# Patient Record
Sex: FEMALE | Race: WHITE | NOT HISPANIC OR LATINO | Employment: OTHER | URBAN - METROPOLITAN AREA
[De-identification: names, ages, dates, MRNs, and addresses within clinical notes are randomized per-mention and may not be internally consistent; named-entity substitution may affect disease eponyms.]

---

## 2017-03-03 ENCOUNTER — TRANSCRIBE ORDERS (OUTPATIENT)
Dept: ADMINISTRATIVE | Facility: HOSPITAL | Age: 67
End: 2017-03-03

## 2017-03-03 DIAGNOSIS — Z85.850 HX OF THYROID CANCER: Primary | ICD-10-CM

## 2017-03-09 ENCOUNTER — HOSPITAL ENCOUNTER (OUTPATIENT)
Dept: RADIOLOGY | Age: 67
Discharge: HOME | End: 2017-03-09
Payer: MEDICARE

## 2017-03-09 DIAGNOSIS — Z85.850 HX OF THYROID CANCER: ICD-10-CM

## 2017-03-09 PROCEDURE — 76536 US EXAM OF HEAD AND NECK: CPT

## 2017-07-28 DIAGNOSIS — Z12.31 ENCOUNTER FOR SCREENING MAMMOGRAM FOR MALIGNANT NEOPLASM OF BREAST: ICD-10-CM

## 2017-08-03 ENCOUNTER — GENERIC CONVERSION - ENCOUNTER (OUTPATIENT)
Dept: OTHER | Facility: OTHER | Age: 67
End: 2017-08-03

## 2018-01-03 ENCOUNTER — TRANSCRIBE ORDERS (OUTPATIENT)
Dept: ADMINISTRATIVE | Facility: HOSPITAL | Age: 68
End: 2018-01-03

## 2018-01-03 ENCOUNTER — ALLSCRIPTS OFFICE VISIT (OUTPATIENT)
Dept: OTHER | Facility: OTHER | Age: 68
End: 2018-01-03

## 2018-01-03 DIAGNOSIS — N63.0 MASS OF BREAST: ICD-10-CM

## 2018-01-03 DIAGNOSIS — N63.0 LUMP OR MASS IN BREAST: Primary | ICD-10-CM

## 2018-01-05 ENCOUNTER — HOSPITAL ENCOUNTER (OUTPATIENT)
Dept: ULTRASOUND IMAGING | Facility: CLINIC | Age: 68
Discharge: HOME | End: 2018-01-05
Payer: MEDICARE

## 2018-01-05 DIAGNOSIS — N63.0 MASS OF BREAST: ICD-10-CM

## 2018-01-05 PROCEDURE — 76642 ULTRASOUND BREAST LIMITED: CPT

## 2018-01-05 NOTE — PROGRESS NOTES
Assessment   1  Breast mass in female (611 72) (N63 0)    Plan   Breast mass in female    · *US BREAST RIGHT LIMITED (DIAGNOSTIC); Status:Hold For - Scheduling; Requested    QLI:[id number]; Perform:[facility]; SHM:[id number];ACNRHKG; For:Breast mass in female; Ordered By:Keena Benavides; Discussion/Summary   Discussion Summary:    Pt does not recall any trauma to breast area  Will plan diagnostic US right breast tomorrow and f/u as needed based on results  Chief Complaint   Chief Complaint Free Text Note Form: Pt for breast complaints  History of Present Illness   HPI: Pt is here for a breast complaint  On  patient's right breast was painful and hard  Yesterday she saw a bruise on her right breast and called the office  No history of trauma or bumps to the area  No nipple discharge  Active Problems   1  Abnormal finding on mammography (793 80) (R92 8)   2  Encounter for gynecological examination (V72 31) (Z01 419)   3  Encounter for screening mammogram for breast cancer (V76 12) (Z12 31)   4  History of hyperlipidemia (V12 29) (Z86 39)   5  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)   6  Postsurgical hypothyroidism (244 0) (E89 0)   7  Yeast vaginitis (112 1) (B37 3)    Past Medical History   1  History of Breast cancer screening (V76 10) (Z12 39)   2   2 para 2 (V49 89) (Z78 9)   3  History of hypertension (V12 59) (Z86 79)   4  History of Menarche (V21 8)   5  History of 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)    Surgical History   1  History of Cholecystectomy   2  History of Inguinal Hernia Repair   3  History of Surgery Excision Of Parotid Tumor/Gland   4  History of Thoracotomy   5  History of Thyroid Surgery Total Thyroidectomy    Family History   Mother    1  Family history of Breast Cancer (V16 3)   2  Family history of cardiac disorder (V17 49) (Z82 49)  Father    3  Family history of Diabetes Mellitus (V18 0)  Maternal Aunt    4   Family history of colon cancer (V16 0) (Z80 0)  Family History    5  Family history of Diabetes Mellitus (V18 0)    Social History    · Being A Social Drinker   · Cultural background   · Denied: History of Drug Use   · Former smoker (V15 82) (H27 380)   ·    · Primary spoken language English   · Racial background    Current Meds    1  Atenolol 50 MG Oral Tablet; Take 1 tablet once daily Recorded   2  Fluconazole 150 MG Oral Tablet; Take pill 1 stat, skip a day, and take pill 2 on day 3; Therapy: [id number] to (Last Rx:03Aug2016)  Requested for: 03Aug2016 Ordered   3  Simvastatin 20 MG Oral Tablet; Therapy: (Recorded:12Jun2013) to Recorded   4  Synthroid 137 MCG Oral Tablet; Therapy: (Recorded:12Jun2013) to Recorded   5  Valsartan-Hydrochlorothiazide 320-25 MG Oral Tablet Recorded   6  Zolpidem Tartrate 10 MG Oral Tablet; Therapy: (Recorded:12Jun2013) to Recorded    Allergies   1  Codeine Sulfate TABS    Vitals   Vital Signs    Recorded: 69BSW9909 69:04GU   Systolic 398   Diastolic 68   Height 5 ft 6 in   Weight 193 lb    BMI Calculated 31 15   BSA Calculated 1 97     Physical Exam        Constitutional      General appearance: No acute distress, well appearing and well nourished  Chest      Breasts: Abnormal  -- (right breast w erythematous area approx the size of a baseball extending from areola to outer breast quandrant  approx 6-8 oclock  under erythematous area w thickened mass-felt flucutant, approx 1inch by 2 inch oblong structure, non tender, no nipple d/c  No dimpling noted, no puckering) Right breast: area of erythema-- and-- swelling, but-- no [place],-- no dimpling,-- no dilated superficial veins-- and-- no implant  Left breast: normal appearance  Examination of the nipples revealed no discharge  Examination of the right nipple revealed no inversion,-- no retraction-- and-- no flattening  Examination of the left nipple revealed no inversion,-- no retraction-- and-- no flattening  Psychiatric      Orientation to person, place, and time: Normal        Mood and affect: Normal        Attending Note   Collaborating Physician Note: Collaborating Physician: I discussed the case with the Advanced Practitioner and reviewed the note-- and-- I agree with the Advanced Practitioner note        Future Appointments      Date/Time Provider Specialty Site   02/13/2018 09:00 AM Kezia Benavides Cap, Nellie0 Silvia Prater Obstetrics[facility]Gynecology [address] OBGYN     Signatures    Electronically signed by : Tu Sagastume; Isma  3 2018 12:37PM EST                       (Author)     Electronically signed by : Justice Cavazos MD; Jan 4 2018 11:45AM EST                       (Author)

## 2018-01-23 VITALS
BODY MASS INDEX: 31.02 KG/M2 | HEIGHT: 66 IN | DIASTOLIC BLOOD PRESSURE: 68 MMHG | WEIGHT: 193 LBS | SYSTOLIC BLOOD PRESSURE: 130 MMHG

## 2018-02-13 ENCOUNTER — OFFICE VISIT (OUTPATIENT)
Dept: OBGYN CLINIC | Facility: CLINIC | Age: 68
End: 2018-02-13
Payer: MEDICARE

## 2018-02-13 VITALS
WEIGHT: 195 LBS | SYSTOLIC BLOOD PRESSURE: 126 MMHG | DIASTOLIC BLOOD PRESSURE: 78 MMHG | BODY MASS INDEX: 32.49 KG/M2 | HEIGHT: 65 IN

## 2018-02-13 DIAGNOSIS — Z01.419 ENCOUNTER FOR ANNUAL ROUTINE GYNECOLOGICAL EXAMINATION: Primary | ICD-10-CM

## 2018-02-13 PROBLEM — N63.0 BREAST MASS IN FEMALE: Status: ACTIVE | Noted: 2018-01-03

## 2018-02-13 PROBLEM — E89.0 HYPOTHYROIDISM, POSTABLATIVE: Status: ACTIVE | Noted: 2017-04-18

## 2018-02-13 PROCEDURE — G0101 CA SCREEN;PELVIC/BREAST EXAM: HCPCS | Performed by: PHYSICIAN ASSISTANT

## 2018-02-13 RX ORDER — LEVOTHYROXINE SODIUM 137 UG/1
TABLET ORAL
COMMUNITY
End: 2018-02-13 | Stop reason: SDUPTHER

## 2018-02-13 RX ORDER — VALSARTAN AND HYDROCHLOROTHIAZIDE 320; 25 MG/1; MG/1
TABLET, FILM COATED ORAL
COMMUNITY
End: 2018-02-13 | Stop reason: SDUPTHER

## 2018-02-13 RX ORDER — ALLOPURINOL 300 MG/1
300 TABLET ORAL DAILY
COMMUNITY
Start: 2017-12-31

## 2018-02-13 RX ORDER — ERGOCALCIFEROL 1.25 MG/1
12500 CAPSULE ORAL WEEKLY
COMMUNITY
Start: 2018-01-07

## 2018-02-13 RX ORDER — SIMVASTATIN 20 MG
20 TABLET ORAL
COMMUNITY
Start: 2014-04-29 | End: 2018-03-03

## 2018-02-13 NOTE — PROGRESS NOTES
Assessment/Plan:         Diagnoses and all orders for this visit:    Encounter for annual routine gynecological examination  -     Mammo screening bilateral w 3d & cad; Future  -     GP Liquid-Based Pap + HPV Plus    Other orders  -     allopurinol (ZYLOPRIM) 300 mg tablet;   -     ergocalciferol (VITAMIN D2) 50,000 units;   -     simvastatin (ZOCOR) 20 mg tablet; Take 20 mg by mouth  -     Discontinue: levothyroxine (SYNTHROID) 137 mcg tablet; Take by mouth  -     Discontinue: valsartan-hydrochlorothiazide (DIOVAN-HCT) 320-25 MG per tablet; Take by mouth          Subjective:      Patient ID: Johnathan Young is a 78 y o  female  Pt for annual GYN exam  No  complaints of hot flashes or night sweats  No recurrent breast issues  Not currently sexually active  Denies vaginal dryness or vaginal complaints  Doing well  The following portions of the patient's history were reviewed and updated as appropriate: past family history, past medical history, past social history, past surgical history and problem list     Review of Systems   Constitutional: Negative  Respiratory: Negative  Gastrointestinal: Negative  Endocrine: Negative  Genitourinary: Negative  Objective:    Vitals:    02/13/18 0915   BP: 126/78        Physical Exam   Constitutional: She is oriented to person, place, and time  She appears well-developed and well-nourished  HENT:   Head: Normocephalic  Neck: Normal range of motion  Neck supple  No tracheal deviation present  No thyromegaly present  Cardiovascular: Normal rate, regular rhythm and normal heart sounds  Pulmonary/Chest: Effort normal  No stridor  No respiratory distress  She has no wheezes  She has no rales  She exhibits no tenderness  Abdominal: Soft  Bowel sounds are normal  She exhibits no distension and no mass  There is no rebound and no guarding  Genitourinary: Uterus normal  Rectal exam shows guaiac negative stool   No vaginal discharge (cx bled a bit w pap) found  Musculoskeletal: She exhibits no edema  Neurological: She is alert and oriented to person, place, and time  Skin: Skin is warm  Psychiatric: She has a normal mood and affect  Her behavior is normal  Judgment and thought content normal        Past Medical History:   Diagnosis Date    [facility]     Cancer [facility] )     Disease of thyroid gland     Gout     History of early menarche     age 16    Hypertension     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[facility] )      Past Surgical History:   Procedure Laterality Date    CHOLECYSTECTOMY      HERNIA REPAIR      INGUINAL HERNIA REPAIR      SALIVARY GLAND SURGERY      excison of tumor    THYROIDECTOMY       Social History     Social History    Marital status: /Civil Union     Spouse name: N/A    Number of children: N/A    Years of education: N/A     Occupational History    Not on file  Social History Main Topics    Smoking status: Former Smoker    Smokeless tobacco: Not on file    Alcohol use Yes      Comment: SOCIAL    Drug use: No    Sexual activity: Not on file     Other Topics Concern    Not on file     Social History Narrative    Non  background    Prim spoken [address] [facility] white          Family History   Problem Relation Age of Onset   Clifm Jhon Breast cancer Mother     Other Mother      cardiac disorder    Diabetes Father     Colon cancer Maternal Aunt     Diabetes Family      Menstrual History:  OB History     No data available         Menarche age: 7  Patient's last menstrual period was 01/01/2002 (approximate)

## 2018-02-15 LAB
HPV HR 12 DNA CVX QL NAA+PROBE: NOT DETECTED
HPV16 DNA SPEC QL NAA+PROBE: NOT DETECTED
HPV18 DNA SPEC QL NAA+PROBE: NOT DETECTED
THIN PREP CVX: NORMAL

## 2018-03-03 ENCOUNTER — APPOINTMENT (EMERGENCY)
Dept: RADIOLOGY | Facility: HOSPITAL | Age: 68
End: 2018-03-03
Payer: MEDICARE

## 2018-03-03 ENCOUNTER — HOSPITAL ENCOUNTER (EMERGENCY)
Facility: HOSPITAL | Age: 68
Discharge: HOME | End: 2018-03-03
Attending: EMERGENCY MEDICINE | Admitting: EMERGENCY MEDICINE
Payer: MEDICARE

## 2018-03-03 VITALS
SYSTOLIC BLOOD PRESSURE: 133 MMHG | HEART RATE: 62 BPM | RESPIRATION RATE: 18 BRPM | WEIGHT: 190 LBS | BODY MASS INDEX: 31.62 KG/M2 | TEMPERATURE: 98.4 F | OXYGEN SATURATION: 95 % | DIASTOLIC BLOOD PRESSURE: 69 MMHG

## 2018-03-03 DIAGNOSIS — J20.9 ACUTE BRONCHITIS: Primary | ICD-10-CM

## 2018-03-03 DIAGNOSIS — J06.9 UPPER RESPIRATORY INFECTION: ICD-10-CM

## 2018-03-03 PROCEDURE — 99283 EMERGENCY DEPT VISIT LOW MDM: CPT

## 2018-03-03 PROCEDURE — 71046 X-RAY EXAM CHEST 2 VIEWS: CPT

## 2018-03-03 RX ORDER — AZITHROMYCIN 250 MG/1
TABLET, FILM COATED ORAL
Qty: 6 TABLET | Refills: 0 | Status: SHIPPED | OUTPATIENT
Start: 2018-03-03 | End: 2018-03-03

## 2018-03-03 RX ORDER — BENZONATATE 100 MG/1
100 CAPSULE ORAL 3 TIMES DAILY PRN
Qty: 21 CAPSULE | Refills: 0 | Status: SHIPPED | OUTPATIENT
Start: 2018-03-03 | End: 2018-03-03

## 2018-03-03 RX ORDER — BENZONATATE 100 MG/1
100 CAPSULE ORAL 3 TIMES DAILY PRN
Qty: 21 CAPSULE | Refills: 0 | Status: SHIPPED | OUTPATIENT
Start: 2018-03-03 | End: 2018-03-10

## 2018-03-03 RX ORDER — TRAZODONE HYDROCHLORIDE 100 MG/1
100 TABLET ORAL
COMMUNITY
End: 2020-02-17

## 2018-03-03 RX ORDER — AZITHROMYCIN 250 MG/1
TABLET, FILM COATED ORAL
Qty: 6 TABLET | Refills: 0 | Status: SHIPPED | OUTPATIENT
Start: 2018-03-03 | End: 2018-03-07

## 2018-03-03 NOTE — ED PROVIDER NOTES
History  Chief Complaint   Patient presents with    Sore Throat     2 days of sore throat, cough and chest congestion  pt went to [facility] but they are closed due to weather     81-year-old female presents to emergency room for evaluation sore throat, nasal congestion, productive cough  Onset 3 days ago  Taking Coricidin without relief   states he was sick recently as well however not nearly as bad and he improved quicker  She denies chest pain, chest tightness, or shortness of breath  Denies change in voice  States she is able to drink fluids well  Denies blood in sputum  Patient has a history of thyroid cancer successfully in remission for many years, she follows up yearly to have it checked  She is a former smoker quit many years ago  History provided by:  Patient      Prior to Admission Medications   Prescriptions Last Dose Informant Patient Reported? Taking? Dextromethorphan-Guaifenesin (CORICIDIN HBP CONGESTION/COUGH)  MG CAPS 3/2/2018 at 1600 Self Yes Yes   Sig: Take 2 capsules by mouth as needed   allopurinol (ZYLOPRIM) 300 mg tablet  Self Yes Yes   Sig: Take 300 mg by mouth daily     atenolol (TENORMIN) 25 mg tablet  Self Yes Yes   Sig: Take 25 mg by mouth daily     ergocalciferol (VITAMIN D2) 50,000 units  Self Yes Yes   Sig: Take 12,500 Units by mouth once a week     levothyroxine (SYNTHROID) 137 mcg tablet  Self Yes Yes   Sig: Take 137 mcg by mouth daily  simvastatin (ZOCOR) 20 mg tablet  Self Yes Yes   Sig: Take 20 mg by mouth daily at bedtime  traZODone (DESYREL) 100 mg tablet  Self Yes Yes   Sig: Take 100 mg by mouth daily at bedtime   valsartan-hydrochlorothiazide (DIOVAN-HCT) 320-25 MG per tablet  Self Yes Yes   Sig: Take 1 tablet by mouth daily        Facility-Administered Medications: None       Past Medical History:   Diagnosis Date    [facility]     [facility]     Disease of thyroid gland     Gout     History of early menarche     age 16  Hypertension     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     Thyroid cancer [facility] )        Past Surgical History:   Procedure Laterality Date    CHOLECYSTECTOMY      HERNIA REPAIR      INGUINAL HERNIA REPAIR      SALIVARY GLAND SURGERY      excison of tumor    THYROIDECTOMY         Family History   Problem Relation Age of Onset   Earna Katie Breast cancer Mother     Other Mother      cardiac disorder    Diabetes Father     Colon cancer Maternal Aunt     Diabetes Family      I have reviewed and agree with the history as documented  Social History   Substance Use Topics    Smoking status: Former Smoker    Smokeless tobacco: Never Used    Alcohol use Yes      Comment: SOCIAL        Review of Systems   Constitutional: Negative for chills and fever  HENT: Positive for congestion and sore throat  Negative for ear pain, trouble swallowing and voice change  Eyes: Negative for discharge and redness  Respiratory: Positive for cough  Negative for shortness of breath  Cardiovascular: Negative for chest pain  Gastrointestinal: Negative for vomiting  Musculoskeletal: Negative for neck pain and neck stiffness  Skin: Negative for rash and wound  Physical Exam  ED Triage Vitals   Temperature Pulse Respirations Blood Pressure SpO2   03/03/18 1130 03/03/18 1130 03/03/18 1130 03/03/18 1130 03/03/18 1130   98 4 °F (36 9 °C) 75 16 138/81 96 %      Temp Source Heart Rate Source Patient Position - Orthostatic VS BP Location FiO2 (%)   03/03/18 1130 03/03/18 1130 03/03/18 1200 03/03/18 1130 --   Oral Monitor Sitting Left arm       Pain Score       03/03/18 1130       6           Orthostatic Vital Signs  Vitals:    03/03/18 1130 03/03/18 1200 03/03/18 1230   BP: 138/81 109/58 118/65   Pulse: 75 66 66   Patient Position - Orthostatic VS:  Sitting Sitting       Physical Exam   Constitutional: She appears well-developed and well-nourished     HENT:   Right Ear: Tympanic membrane and external ear normal    Left Ear: Tympanic membrane and external ear normal    Nose: Mucosal edema, rhinorrhea and septal deviation present  Mouth/Throat: Uvula is midline, oropharynx is clear and moist and mucous membranes are normal  No tonsillar exudate  Eyes: Conjunctivae are normal    Neck: Neck supple  Cardiovascular: Normal rate, regular rhythm and normal heart sounds  Pulmonary/Chest: Effort normal    Slight rhonchi cleared with cough, mild decreased breath sounds  Lymphadenopathy:     She has no cervical adenopathy  Neurological: She is alert  Skin: Skin is warm and dry  No rash noted  Nursing note and vitals reviewed  ED Medications  Medications - No data to display    Diagnostic Studies  Results Reviewed     None                 XR chest 2 views   ED Interpretation by Coral Vallecillo PA-C (03/03 1312)   No acute disease                 Procedures  Procedures       Phone Contacts  ED Phone Contact    ED Course  ED Course                                MDM  Number of Diagnoses or Management Options  Acute bronchitis:   Upper respiratory infection:      Amount and/or Complexity of Data Reviewed  Tests in the radiology section of CPT®: ordered and reviewed    Risk of Complications, Morbidity, and/or Mortality  General comments: Differential diagnosis includes but is not limited to: uri, sinusitis, viral pharyngitis, bronchitis, pneumonia    Patient Progress  Patient progress: stable    CritCare Time    Disposition  Final diagnoses:   Acute bronchitis   Upper respiratory infection     Time reflects when diagnosis was documented in both MDM as applicable and the Disposition within this note     Time User Action Codes Description Comment    3/3/2018  1:13 PM Delfino TRAVIS Add [J20 9] Acute bronchitis     3/3/2018  1:13 PM Anastacia Long [J06 9] Upper respiratory infection       ED Disposition     ED Disposition Condition Comment    Discharge  Debora Villa discharge to home/self care      Condition at discharge: Good Follow-up Information     Follow up With Specialties Details Why Contact Info Additional Information    Sanaz Arredondo MD Internal Medicine In 3 days  [address] [facility]  [address]  [address] [place] [address]  [facility] Emergency Department Emergency Medicine  If symptoms worsen [address] [facility] [address] [phone]  ED, [address], [place], [place], [address]        Patient's Medications   Discharge Prescriptions    AZITHROMYCIN (ZITHROMAX) 250 MG TABLET    2 PO Daily on Day 1 then 1 PO daily days 2-5       Start Date: 3/3/2018  End Date: 3/7/2018       Order Dose: --       Quantity: 6 tablet    Refills: 0    BENZONATATE (TESSALON PERLES) 100 MG CAPSULE    Take 1 capsule (100 mg total) by mouth 3 (three) times a day as needed for cough for up to 7 days       Start Date: 3/3/2018  End Date: 3/10/2018       Order Dose: 100 mg       Quantity: 21 capsule    Refills: 0     No discharge procedures on file      ED Provider  Electronically Signed by           Joseluis Terry PA-C  18 2160

## 2018-03-03 NOTE — DISCHARGE INSTRUCTIONS
Acute Bronchitis   WHAT YOU NEED TO KNOW:   Acute bronchitis is swelling and irritation in the air passages of your lungs  This irritation may cause you to cough or have other breathing problems  Acute bronchitis often starts because of another illness, such as a cold or the flu  The illness spreads from your nose and throat to your windpipe and airways  Bronchitis is often called a chest cold  Acute bronchitis lasts about 3 to 6 weeks and is usually not a serious illness  Your cough can last for several weeks  DISCHARGE INSTRUCTIONS:   Return to the emergency department if:   · You cough up blood  · Your lips or fingernails turn blue  · You feel like you are not getting enough air when you breathe  Contact your healthcare provider if:   · You have a fever  · Your breathing problems do not go away or get worse  · Your cough does not get better within 4 weeks  · You have questions or concerns about your condition or care  Self-care:   · Get more rest   Rest helps your body to heal  Slowly start to do more each day  Rest when you feel it is needed  · Avoid irritants in the air  Avoid chemicals, fumes, and dust  Wear a face mask if you must work around dust or fumes  Stay inside on days when air pollution levels are high  If you have allergies, stay inside when pollen counts are high  Do not use aerosol products, such as spray-on deodorant, bug spray, and hair spray  · Do not smoke or be around others who smoke  Nicotine and other chemicals in cigarettes and cigars damages the cilia that move mucus out of your lungs  Ask your healthcare provider for information if you currently smoke and need help to quit  E-cigarettes or smokeless tobacco still contain nicotine  Talk to your healthcare provider before you use these products  · Drink liquids as directed  Liquids help keep your air passages moist and help you cough up mucus   You may need to drink more liquids when you have acute bronchitis  Ask how much liquid to drink each day and which liquids are best for you  · Use a humidifier or vaporizer  Use a cool mist humidifier or a vaporizer to increase air moisture in your home  This may make it easier for you to breathe and help decrease your cough  Decrease risk for acute bronchitis:   · Get the vaccinations you need  Ask your healthcare provider if you should get vaccinated against the flu or pneumonia  · Prevent the spread of germs  You can decrease your risk of acute bronchitis and other illnesses by doing the following:     [facility] your hands often with soap and water  Carry germ-killing hand lotion or gel with you  You can use the lotion or gel to clean your hands when soap and water are not available  ¨ Do not touch your eyes, nose, or mouth unless you have washed your hands first     ¨ Always cover your mouth when you cough to prevent the spread of germs  It is best to cough into a tissue or your shirt sleeve instead of into your hand  Ask those around you cover their mouths when they cough  ¨ Try to avoid people who have a cold or the flu  If you are sick, stay away from others as much as possible  Medicines: Your healthcare provider may  give you any of the following:  · Ibuprofen or acetaminophen  are medicines that help lower your fever  They are available without a doctor's order  Ask your healthcare provider which medicine is right for you  Ask how much to take and how often to take it  Follow directions  These medicines can cause stomach bleeding if not taken correctly  Ibuprofen can cause kidney damage  Do not take ibuprofen if you have kidney disease, an ulcer, or allergies to aspirin  Acetaminophen can cause liver damage  Do not take more than 4,000 milligrams in 24 hours  · Decongestants  help loosen mucus in your lungs and make it easier to cough up  This can help you breathe easier  · Cough suppressants  decrease your urge to cough   If your cough produces mucus, do not take a cough suppressant unless your healthcare provider tells you to  Your healthcare provider may suggest that you take a cough suppressant at night so you can rest     · Inhalers  may be given  Your healthcare provider may give you one or more inhalers to help you breathe easier and cough less  An inhaler gives your medicine to open your airways  Ask your healthcare provider to show you how to use your inhaler correctly  · Take your medicine as directed  Contact your healthcare provider if you think your medicine is not helping or if you have side effects  Tell him of her if you are allergic to any medicine  Keep a list of the medicines, vitamins, and herbs you take  Include the amounts, and when and why you take them  Bring the list or the pill bottles to follow-up visits  Carry your medicine list with you in case of an emergency  Follow up with your healthcare provider as directed:  Write down questions you have so you will remember to ask them during your follow-up visits  © 2017 [address] [employer] Information is for End User's use only and may not be sold, redistributed or otherwise used for commercial purposes  All illustrations and images included in CareNotes® are the copyrighted property of [employer]  or [employer]  The above information is an  only  It is not intended as medical advice for individual conditions or treatments  Talk to your doctor, nurse or pharmacist before following any medical regimen to see if it is safe and effective for you

## 2018-10-02 ENCOUNTER — TELEPHONE (OUTPATIENT)
Dept: OBGYN CLINIC | Facility: CLINIC | Age: 68
End: 2018-10-02

## 2018-11-03 ENCOUNTER — TRANSCRIBE ORDERS (OUTPATIENT)
Dept: ADMINISTRATIVE | Facility: HOSPITAL | Age: 68
End: 2018-11-03

## 2018-11-08 DIAGNOSIS — Z12.31 SCREENING MAMMOGRAM, ENCOUNTER FOR: Primary | ICD-10-CM

## 2018-11-09 ENCOUNTER — HOSPITAL ENCOUNTER (OUTPATIENT)
Dept: RADIOLOGY | Facility: MEDICAL CENTER | Age: 68
Discharge: HOME | End: 2018-11-09
Payer: MEDICARE

## 2018-11-09 DIAGNOSIS — Z12.31 SCREENING MAMMOGRAM, ENCOUNTER FOR: ICD-10-CM

## 2018-11-09 PROCEDURE — 77067 SCR MAMMO BI INCL CAD: CPT

## 2018-11-09 PROCEDURE — 77063 BREAST TOMOSYNTHESIS BI: CPT

## 2019-12-12 DIAGNOSIS — Z12.39 SCREENING FOR BREAST CANCER: Primary | ICD-10-CM

## 2020-02-15 NOTE — PROGRESS NOTES
Assessment/Plan   Diagnoses and all orders for this visit:    Encounter for annual routine gynecological examination    Breast cancer screening    Hypoestrogenism  -     DXA bone density spine hip and pelvis; Future    Colon cancer screening  -     Occult Blood, Fecal Immunochemical; Future    Other orders  -     olmesartan-hydrochlorothiazide (BENICAR HCT) 40-25 MG per tablet        Discussion    All questions have been answered to her satisfaction  RTO for APE or sooner if needed      Subjective     Pt doing well  No  bleeding or  complaints  No issues vaginally  Pt  but not sexually active at all  Flavio Porras is a 77 y o  female who presents for annual well woman exam    LMP - years ago, approx 2002, menarche age 7 ; Denies  bleeding  No vulvar itch/burn; No vaginal itch/burn; No abn discharge or odor; No urinary sx - burning/pain/frequency/hematuria  (+) SBEs - no breast masses, asymmetry, nipple discharge or bleeding, changes in skin of breast, or breast tenderness bilaterally  No abd/pelvic pain or HAs; No menopausal symptoms: No hot flashes/night sweats, problems with intercourse, vaginal dryness; sleeping well  Pt is not sexually active  She declines std/hiv/hep testing; Feels safe at home    (+) PCP for routine Bw/care- Dr Karrie Colvin    Last Pap - 2/13/18 WNL neg HPV  History of abnormal Pap smear: never   Last mammo - scheduled 3/4/20 , last done 11/18 WNL   History of abnormal mammogram: never   Last colonoscopy - pt refuse due to concerns about drinking solution  Pt is open to FIT testing  Ordered today, she will confirm w insurance for coverage  Last Dexa scan - never done, ordered today    Review of Systems   Constitutional: Negative  Respiratory: Negative  Gastrointestinal: Negative  Endocrine: Negative  Genitourinary: Negative          The following portions of the patient's history were reviewed and updated as appropriate: allergies, current medications, past family history, past medical history, past social history, past surgical history and problem list          OB History        2    Para   2    Term                AB        Living           SAB        TAB        Ectopic        Multiple        Live Births   2                 Past Medical History:   Diagnosis Date    [facility] cancer ([facility] )     Cancer ([facility] )     Disease of thyroid gland     Gout     History of early menarche     age 16    Hypertension     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     Thyroid cancer ([facility] )        Past Surgical History:   Procedure Laterality Date    CHOLECYSTECTOMY      HERNIA REPAIR      INGUINAL HERNIA REPAIR      SALIVARY GLAND SURGERY      excison of tumor    THYROIDECTOMY         Family History   Problem Relation Age of Onset   Dillon Flower Breast cancer Mother     Other Mother         cardiac disorder    Diabetes Father     Colon cancer Maternal Aunt     Diabetes Family        Social History     Socioeconomic History    Marital status: /Civil Union     Spouse name: Not on file    Number of children: Not on file    Years of education: Not on file    Highest education level: Not on file   Occupational History    Not on file   Social Needs    Financial resource strain: Not on file    Food insecurity:     Worry: Not on file     Inability: Not on file    Transportation needs:     Medical: Not on file     Non-medical: Not on file   Tobacco Use    Smoking status: Former Smoker    Smokeless tobacco: Never Used   Substance and Sexual Activity    Alcohol use: Yes     Comment: SOCIAL    Drug use: No    Sexual activity: Yes     Partners: Male   Lifestyle    Physical activity:     Days per week: Not on file     Minutes per session: Not on file    Stress: Not on file   Relationships    Social connections:     Talks on phone: Not on file     Gets together: Not on file     Attends Zoroastrian service: Not on file     Active member of club or organization: Not on file Attends meetings of clubs or organizations: Not on file     Relationship status: Not on file    Intimate partner violence:     Fear of current or ex partner: Not on file     Emotionally abused: Not on file     Physically abused: Not on file     Forced sexual activity: Not on file   Other Topics Concern    Not on file   Social History Narrative    Non  background    Prim spoken aryna english    Race white          Current Outpatient Medications:     olmesartan-hydrochlorothiazide (BENICAR HCT) 40-25 MG per tablet, , Disp: , Rfl:     allopurinol (ZYLOPRIM) 300 mg tablet, Take 300 mg by mouth daily  , Disp: , Rfl:     atenolol (TENORMIN) 25 mg tablet, Take 25 mg by mouth daily  , Disp: , Rfl:     ergocalciferol (VITAMIN D2) 50,000 units, Take 12,500 Units by mouth once a week  , Disp: , Rfl:     levothyroxine (SYNTHROID) 137 mcg tablet, Take 137 mcg by mouth daily  , Disp: , Rfl:     simvastatin (ZOCOR) 20 mg tablet, Take 20 mg by mouth daily at bedtime  , Disp: , Rfl:     Allergies   Allergen Reactions    Codeine Rash     Annotation - [id number]: Generic       Objective   Vitals:    02/17/20 1007   BP: 140/88   BP Location: Left arm   Patient Position: Sitting   Cuff Size: Standard   Weight: 92 5 kg (204 lb)   Height: 5' 5" (1 651 m)     Physical Exam   Constitutional: She is oriented to person, place, and time  She appears well-developed and well-nourished  HENT:   Head: Normocephalic and atraumatic  Cardiovascular: Normal rate and regular rhythm  Pulmonary/Chest: Effort normal and breath sounds normal  Right breast exhibits no inverted nipple, no mass, no nipple discharge, no skin change and no tenderness  Left breast exhibits no inverted nipple, no mass, no nipple discharge, no skin change and no tenderness  Breasts are symmetrical    Abdominal: Soft  She exhibits no distension and no mass  There is no rebound and no guarding     Genitourinary: Vagina normal and uterus normal  Rectal exam shows guaiac negative stool  Pelvic exam was performed with patient supine  There is no rash on the right labia  There is no rash on the left labia  Cervix exhibits no motion tenderness  Right adnexum displays no mass  Left adnexum displays no mass  No erythema in the vagina  No foreign body in the vagina  No signs of injury around the vagina  No vaginal discharge found  Genitourinary Comments: WNL urethral meatus   Neurological: She is alert and oriented to person, place, and time  Skin: Skin is warm and dry  Psychiatric: She has a normal mood and affect  Patient Instructions   Pap deferred due to guidelines  Mammo scheduled in 2 weeks  FIT test ordered and given instructions on how to perform

## 2020-02-17 ENCOUNTER — ANNUAL EXAM (OUTPATIENT)
Dept: OBGYN CLINIC | Facility: CLINIC | Age: 70
End: 2020-02-17
Payer: MEDICARE

## 2020-02-17 VITALS
HEIGHT: 65 IN | WEIGHT: 204 LBS | DIASTOLIC BLOOD PRESSURE: 88 MMHG | BODY MASS INDEX: 33.99 KG/M2 | SYSTOLIC BLOOD PRESSURE: 140 MMHG

## 2020-02-17 DIAGNOSIS — Z12.11 COLON CANCER SCREENING: ICD-10-CM

## 2020-02-17 DIAGNOSIS — Z01.419 ENCOUNTER FOR ANNUAL ROUTINE GYNECOLOGICAL EXAMINATION: Primary | ICD-10-CM

## 2020-02-17 DIAGNOSIS — E28.39 HYPOESTROGENISM: ICD-10-CM

## 2020-02-17 DIAGNOSIS — Z12.39 BREAST CANCER SCREENING: ICD-10-CM

## 2020-02-17 PROCEDURE — G0101 CA SCREEN;PELVIC/BREAST EXAM: HCPCS | Performed by: PHYSICIAN ASSISTANT

## 2020-02-17 RX ORDER — OLMESARTAN MEDOXOMIL AND HYDROCHLOROTHIAZIDE 40/25 40; 25 MG/1; MG/1
TABLET ORAL
COMMUNITY
Start: 2019-03-25

## 2020-02-17 NOTE — PATIENT INSTRUCTIONS
Pap deferred due to guidelines  Mammo scheduled in 2 weeks  FIT test ordered and given instructions on how to perform

## 2020-03-04 ENCOUNTER — HOSPITAL ENCOUNTER (OUTPATIENT)
Dept: RADIOLOGY | Facility: MEDICAL CENTER | Age: 70
Discharge: HOME | End: 2020-03-04
Payer: MEDICARE

## 2020-03-04 VITALS — BODY MASS INDEX: 33.99 KG/M2 | HEIGHT: 65 IN | WEIGHT: 204 LBS

## 2020-03-04 DIAGNOSIS — Z12.39 SCREENING FOR BREAST CANCER: ICD-10-CM

## 2020-03-04 PROCEDURE — 77067 SCR MAMMO BI INCL CAD: CPT

## 2020-03-04 PROCEDURE — 77063 BREAST TOMOSYNTHESIS BI: CPT

## 2021-03-05 DIAGNOSIS — Z12.31 ENCOUNTER FOR SCREENING MAMMOGRAM FOR MALIGNANT NEOPLASM OF BREAST: Primary | ICD-10-CM

## 2021-03-10 DIAGNOSIS — Z23 ENCOUNTER FOR IMMUNIZATION: ICD-10-CM

## 2021-03-23 ENCOUNTER — TELEPHONE (OUTPATIENT)
Dept: OBGYN CLINIC | Facility: CLINIC | Age: 71
End: 2021-03-23

## 2021-03-23 NOTE — TELEPHONE ENCOUNTER
Reviewed recommendations of COVID vaccine and mammo  Patient states no issues, just routine screening so will wait until after her series of COVID vaccine plus the 6 weeks

## 2021-03-26 ENCOUNTER — IMMUNIZATIONS (OUTPATIENT)
Dept: FAMILY MEDICINE CLINIC | Facility: HOSPITAL | Age: 71
End: 2021-03-26

## 2021-03-26 DIAGNOSIS — Z23 ENCOUNTER FOR IMMUNIZATION: Primary | ICD-10-CM

## 2021-03-26 PROCEDURE — 91300 SARS-COV-2 / COVID-19 MRNA VACCINE (PFIZER-BIONTECH) 30 MCG: CPT

## 2021-03-26 PROCEDURE — 0001A SARS-COV-2 / COVID-19 MRNA VACCINE (PFIZER-BIONTECH) 30 MCG: CPT

## 2021-04-16 ENCOUNTER — IMMUNIZATIONS (OUTPATIENT)
Dept: FAMILY MEDICINE CLINIC | Facility: HOSPITAL | Age: 71
End: 2021-04-16

## 2021-04-16 DIAGNOSIS — Z23 ENCOUNTER FOR IMMUNIZATION: Primary | ICD-10-CM

## 2021-04-16 PROCEDURE — 91300 SARS-COV-2 / COVID-19 MRNA VACCINE (PFIZER-BIONTECH) 30 MCG: CPT

## 2021-04-16 PROCEDURE — 0002A SARS-COV-2 / COVID-19 MRNA VACCINE (PFIZER-BIONTECH) 30 MCG: CPT

## 2021-04-27 ENCOUNTER — TELEPHONE (OUTPATIENT)
Dept: OBGYN CLINIC | Facility: CLINIC | Age: 71
End: 2021-04-27

## 2021-04-27 NOTE — TELEPHONE ENCOUNTER
Pt states when she wipes she noticed brown discharge and noticed a brown stringy discharged  Pt states spotting started about a week ago  In the time since the spotting started it has increased but very minimal  Pt wears a pad all the time  Odor that is different could not describe smell  Denies pelvic pain or cramping  Bleeding seemed to start after second covid vaccine  Please review  Could offer apt Thursday with you

## 2021-04-29 ENCOUNTER — OFFICE VISIT (OUTPATIENT)
Dept: OBGYN CLINIC | Facility: CLINIC | Age: 71
End: 2021-04-29
Payer: MEDICARE

## 2021-04-29 VITALS
WEIGHT: 197 LBS | DIASTOLIC BLOOD PRESSURE: 70 MMHG | SYSTOLIC BLOOD PRESSURE: 126 MMHG | HEIGHT: 66 IN | BODY MASS INDEX: 31.66 KG/M2

## 2021-04-29 DIAGNOSIS — B37.3 VAGINAL YEAST INFECTION: ICD-10-CM

## 2021-04-29 DIAGNOSIS — N89.8 VAGINAL DISCHARGE: Primary | ICD-10-CM

## 2021-04-29 DIAGNOSIS — N89.8 VAGINAL ODOR: ICD-10-CM

## 2021-04-29 LAB
BV WHIFF TEST VAG QL: ABNORMAL
CLUE CELLS SPEC QL WET PREP: ABNORMAL
PH SMN: 4.5 [PH]
SL AMB POCT WET MOUNT: ABNORMAL
T VAGINALIS VAG QL WET PREP: ABNORMAL
YEAST VAG QL WET PREP: ABNORMAL

## 2021-04-29 PROCEDURE — 99212 OFFICE O/P EST SF 10 MIN: CPT | Performed by: NURSE PRACTITIONER

## 2021-04-29 PROCEDURE — 87210 SMEAR WET MOUNT SALINE/INK: CPT | Performed by: NURSE PRACTITIONER

## 2021-04-29 RX ORDER — FLUCONAZOLE 150 MG/1
150 TABLET ORAL ONCE
Qty: 1 TABLET | Refills: 0 | Status: SHIPPED | OUTPATIENT
Start: 2021-04-29 | End: 2021-04-29

## 2021-04-29 NOTE — PROGRESS NOTES
Assessment/Plan:      Diagnoses and all orders for this visit:    Vaginal discharge  -     POCT wet mount    Vaginal odor  -     POCT wet mount    Vaginal yeast infection  -     fluconazole (DIFLUCAN) 150 mg tablet; Take 1 tablet (150 mg total) by mouth once for 1 dose      - reviewed diagnosis of vaginal yeast infection and treatment with  Fluconazole  - reviewed with patient continues  After treatment for vaginal yeast infection with brown spotting would recommend pelvic ultrasound  - signs and symptoms report reviewed    RTO 2021  For annual exam sooner if symptoms do not resolve    Subjective:     Patient ID:  Putney is a 72 y o  female  HPI  with complaints vaginal discharge and for approximately 1 week  Vaginal discharge is described as  Small, brown  And thread like  Odor is described and different  Minimal itching  Denies alleviating or aggravating factors  Has not tried any over-the-counter remedies  Had similar episodes in the past   Denies new partner, pelvic pain, fever, chills bowel or bladder concerns    Review of Systems   Constitutional: Negative for chills, fatigue and fever  Respiratory: Negative  Cardiovascular: Negative  Genitourinary: Positive for vaginal discharge  Negative for decreased urine volume, difficulty urinating, dysuria, enuresis, flank pain, frequency, genital sores, hematuria, menstrual problem, pelvic pain, urgency, vaginal bleeding and vaginal pain  Objective:     Physical Exam  Exam conducted with a chaperone present  Constitutional:       Appearance: Normal appearance  Cardiovascular:      Rate and Rhythm: Normal rate and regular rhythm  Pulmonary:      Effort: Pulmonary effort is normal       Breath sounds: Normal breath sounds  Genitourinary:     General: Normal vulva  Vagina: Vaginal discharge present  Uterus: Normal        Comments:  Small amount of tan vaginal discharge    No evidence of vaginal sores or lesions  Neurological:      Mental Status: She is alert and oriented to person, place, and time     Psychiatric:         Mood and Affect: Mood normal          Behavior: Behavior normal

## 2021-04-29 NOTE — PATIENT INSTRUCTIONS
Fluconazole (Diflucan) - (By mouth)   Why this medicine is used:   Prevents and treats fungal infections  Contact a nurse or doctor right away if you have:  · Blistering, peeling, red skin rash  · Fast, pounding, or uneven heartbeat; lightheadedness or fainting  · Dark urine or pale stools, vomiting, loss of appetite  · Yellow skin or eyes  · Unusual bleeding, bruising, or weakness     Common side effects:  · Mild nausea, vomiting, diarrhea  · Headache  © Copyright [employer] [facility] 2021 Information is for End User's use only and may not be sold, redistributed or otherwise used for commercial purposes  Yeast Infection   WHAT YOU NEED TO KNOW:   What is a yeast infection? A yeast infection, or vulvovaginal candidiasis, is a common vaginal infection  Vulvovaginal candidiasis is caused by a fungus, or yeast-like germ  Fungi are normally found in your vagina  Too many fungi can cause an infection  What increases my risk for a yeast infection? · Pregnancy    · Medicines, such as antibiotics, birth control pills, or steroid medicine    · Medical conditions, such as diabetes    · Contraceptive devices, such as diaphragms, sponges, and intrauterine devices    What are the signs and symptoms of a yeast infection? · Thick, white, cheese-like discharge from your vagina    · Itching, swelling, and redness in your vagina    · Burning when you urinate    How is a yeast infection diagnosed and treated? · Your healthcare provider will ask about your medical history and examine you  A sample of your vaginal discharge may show what germ is causing your infection  · Medicines help treat the fungal infection and decrease inflammation  The medicine may be a pill, cream, ointment, or vaginal tablet or suppository  With treatment, the infection is usually gone within a week  Keep your vagina healthy:   · Do not have sex until your symptoms go away    Have your partner wear a condom until you complete your course of medication  · Always wipe from front to back  after you use the toilet  This prevents spreading bacteria from your rectal area into your vagina  · Clean around your vulva with mild soap and warm water each day  Gently dry the area after washing  Do not use hot tubs  The heat and moisture from hot tubs can increase your risk for another yeast infection  · Do not wear tight-fitting clothes or undergarments  for long periods  Wear cotton underwear during the day  Cotton helps keep your genital area dry and does not hold in warmth or moisture  Do not wear underwear at night  · Change your laundry soap or fabric softener  if you think it is irritating your skin  · Do not douche  or use feminine hygiene sprays or bubble bath  Do not use pads or tampons that are scented, or colored or perfumed toilet paper  · Ask your healthcare provider about birth control options if necessary  Condoms have latex and diaphragms have gel that kills sperm  Both of these may irritate your genital area  When should I contact my healthcare provider? · You have fever and chills  · You develop abdominal or pelvic pain  · Your discharge is bloody and it is not your monthly period  · Your signs and symptoms get worse, even after treatment  · You have questions or concerns about your condition or care  CARE AGREEMENT:   You have the right to help plan your care  Learn about your health condition and how it may be treated  Discuss treatment options with your healthcare providers to decide what care you want to receive  You always have the right to refuse treatment  The above information is an  only  It is not intended as medical advice for individual conditions or treatments  Talk to your doctor, nurse or pharmacist before following any medical regimen to see if it is safe and effective for you    © Copyright [address] Information is for End User's use only and may not be sold, redistributed or otherwise used for commercial purposes   All illustrations and images included in CareNotes® are the copyrighted property of A D A M , Inc  or [facility] Anuradha Valle

## 2021-06-04 ENCOUNTER — HOSPITAL ENCOUNTER (OUTPATIENT)
Dept: MAMMOGRAPHY | Facility: CLINIC | Age: 71
Discharge: HOME | End: 2021-06-04
Payer: MEDICARE

## 2021-06-04 DIAGNOSIS — Z12.31 ENCOUNTER FOR SCREENING MAMMOGRAM FOR MALIGNANT NEOPLASM OF BREAST: ICD-10-CM

## 2021-06-04 PROCEDURE — 77063 BREAST TOMOSYNTHESIS BI: CPT

## 2021-06-04 PROCEDURE — 77067 SCR MAMMO BI INCL CAD: CPT

## 2021-12-14 ENCOUNTER — IMMUNIZATIONS (OUTPATIENT)
Dept: FAMILY MEDICINE CLINIC | Facility: HOSPITAL | Age: 71
End: 2021-12-14

## 2021-12-14 DIAGNOSIS — Z23 ENCOUNTER FOR IMMUNIZATION: Primary | ICD-10-CM

## 2021-12-14 PROCEDURE — 0001A COVID-19 PFIZER VACC 0.3 ML: CPT

## 2021-12-14 PROCEDURE — 91300 COVID-19 PFIZER VACC 0.3 ML: CPT

## 2022-03-29 DIAGNOSIS — Z12.31 ENCOUNTER FOR SCREENING MAMMOGRAM FOR BREAST CANCER: Primary | ICD-10-CM

## 2022-05-16 ENCOUNTER — ANNUAL EXAM (OUTPATIENT)
Dept: OBGYN CLINIC | Facility: CLINIC | Age: 72
End: 2022-05-16
Payer: MEDICARE

## 2022-05-16 VITALS
WEIGHT: 201 LBS | SYSTOLIC BLOOD PRESSURE: 120 MMHG | HEIGHT: 66 IN | BODY MASS INDEX: 32.3 KG/M2 | DIASTOLIC BLOOD PRESSURE: 78 MMHG

## 2022-05-16 DIAGNOSIS — Z01.419 ENCOUNTER FOR ANNUAL ROUTINE GYNECOLOGICAL EXAMINATION: Primary | ICD-10-CM

## 2022-05-16 DIAGNOSIS — Z12.11 SCREENING FOR COLON CANCER: ICD-10-CM

## 2022-05-16 DIAGNOSIS — E28.39 HYPOESTROGENISM: ICD-10-CM

## 2022-05-16 PROCEDURE — G0101 CA SCREEN;PELVIC/BREAST EXAM: HCPCS | Performed by: PHYSICIAN ASSISTANT

## 2022-05-16 RX ORDER — LEVOTHYROXINE SODIUM 125 UG/1
TABLET ORAL
COMMUNITY
Start: 2022-05-09

## 2022-05-16 NOTE — ASSESSMENT & PLAN NOTE
-Reviewed pap smear screening guidelines, pap not indicated due to patient age and history of normal pap smears  -Reviewed colon cancer screening guidelines, patient is most interested in cologuard  -Cologuard script ordered  -Reviewed Dexa Scan screening guidelines, script for dexa scan provided  -Reviewed mammo screening guidelines, patient has screening mammo schedule for 6/6/2022  -Recommend follow up in two years or sooner if issues arise

## 2022-05-16 NOTE — PROGRESS NOTES
Assessment/Plan   Problem List Items Addressed This Visit        Other    Encounter for annual routine gynecological examination - Primary     -Reviewed pap smear screening guidelines, pap not indicated due to patient age and history of normal pap smears  -Reviewed colon cancer screening guidelines, patient is most interested in cologuard  -Cologuard script ordered  -Reviewed Dexa Scan screening guidelines, script for dexa scan provided  -Reviewed mammo screening guidelines, patient has screening mammo schedule for 6/6/2022  -Recommend follow up in two years or sooner if issues arise                 Other Visit Diagnoses     Hypoestrogenism        Screening for colon cancer              Discussion  All questions have been answered to her satisfaction  RTO for APE or sooner if needed      Subjective     HPI   Adal Muir is a 80 y o  female who presents for annual well woman exam      LMP approximately 2002 ; Denies  bleeding  No vulvar itch/burn; No vaginal itch/burn; No abn discharge or odor; No urinary sx - burning/pain/frequency/hematuria    (+) SBEs - no breast masses, asymmetry, nipple discharge or bleeding, changes in skin of breast, or breast tenderness bilaterally    No abd/pelvic pain or HAs  No menopausal symptoms: No hot flashes/night sweats, problems with intercourse, vaginal dryness; sleeping well  Pt is  but not sexually active  She declines sti/hiv/hep testing; Feels safe at home    (+) PCP for routine Bw/care; Last Pap - 2/13/2018 NILM and HPV 16/18 negative  History of abnormal Pap smear: no  Last mammo - 6/4/2021  History of abnormal mammogram: had excision in past, benign     Last colonoscopy - has never had  Interested in cologuard  Last Dexa scan - has never had    Review of Systems   Respiratory: Negative for shortness of breath  Cardiovascular: Negative for chest pain  Gastrointestinal: Negative for constipation and diarrhea     Genitourinary: Negative for difficulty urinating, dysuria, flank pain, genital sores, hematuria, menstrual problem, pelvic pain, urgency, vaginal bleeding, vaginal discharge and vaginal pain         The following portions of the patient's history were reviewed and updated as appropriate: allergies, current medications, past family history, past medical history, past social history, past surgical history and problem list          OB History        2    Para   2    Term   2            AB        Living           SAB        IAB        Ectopic        Multiple        Live Births   2                 Past Medical History:   Diagnosis Date    Cancer ([facility] )     Disease of thyroid gland     Gout     History of early menarche     age 16    Hypertension     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[facility] )     Thyroid cancer ([facility] )        Past Surgical History:   Procedure Laterality Date    BREAST CYST EXCISION Right     neg    CHOLECYSTECTOMY      HERNIA REPAIR      INGUINAL HERNIA REPAIR      MAMMO NEEDLE LOCALIZATION RIGHT (ALL INC) Right 3/20/2014    SALIVARY GLAND SURGERY      excison of tumor    THYROIDECTOMY         Family History   Problem Relation Age of Onset    Other Mother         cardiac disorder    Breast cancer Mother [de-identified]    Diabetes Father     Colon cancer Maternal Aunt 48    Lung cancer Maternal Aunt     Diabetes Family     No Known Problems Sister     No Known Problems Daughter     No Known Problems Maternal Grandmother     No Known Problems Maternal Grandfather     Brain cancer Paternal Grandmother 76    No Known Problems Paternal Grandfather     No Known Problems Daughter     No Known Problems Paternal Aunt     No Known Problems Paternal Aunt        Social History     Socioeconomic History    Marital status: /Civil Union     Spouse name: Not on file    Number of children: Not on file    Years of education: Not on file    Highest education level: Not on file   Occupational History    Not on file Tobacco Use    Smoking status: Former Smoker    Smokeless tobacco: Never Used   Substance and Sexual Activity    Alcohol use: Yes     Comment: SOCIAL    Drug use: No    Sexual activity: Yes     Partners: Male   Other Topics Concern    Not on file   Social History Narrative    Non  background    Prim spoken aryan english    Race white      Social Determinants of Health     Financial Resource Strain: Not on file   Food Insecurity: Not on file   Transportation Needs: Not on file   Physical Activity: Not on file   Stress: Not on file   Social Connections: Not on file   Intimate Partner Violence: Not on file   Housing Stability: Not on file         Current Outpatient Medications:     allopurinol (ZYLOPRIM) 300 mg tablet, Take 300 mg by mouth daily  , Disp: , Rfl:     atenolol (TENORMIN) 25 mg tablet, Take 25 mg by mouth daily  , Disp: , Rfl:     ergocalciferol (VITAMIN D2) 50,000 units, Take 12,500 Units by mouth once a week  , Disp: , Rfl:     olmesartan-hydrochlorothiazide (BENICAR HCT) 40-25 MG per tablet, , Disp: , Rfl:     simvastatin (ZOCOR) 20 mg tablet, Take 20 mg by mouth daily at bedtime  , Disp: , Rfl:     Synthroid 125 MCG tablet, , Disp: , Rfl:     Allergies   Allergen Reactions    Codeine Rash     Annotation - [id number]: Generic    Penicillin G Rash       Objective   Vitals:    05/16/22 1137   BP: 120/78   BP Location: Left arm   Patient Position: Sitting   Cuff Size: Standard   Weight: 91 2 kg (201 lb)   Height: 5' 6" (1 676 m)     Physical Exam  Constitutional:       Appearance: Normal appearance  HENT:      Head: Normocephalic and atraumatic  Cardiovascular:      Rate and Rhythm: Normal rate and regular rhythm  Pulmonary:      Effort: Pulmonary effort is normal       Breath sounds: Normal breath sounds  Chest:      Chest wall: No mass, lacerations, deformity, swelling, tenderness or edema     Breasts:      Right: Normal  No swelling, bleeding, inverted nipple, mass, nipple discharge, skin change, tenderness, axillary adenopathy or supraclavicular adenopathy  Left: Normal  No swelling, bleeding, inverted nipple, mass, nipple discharge, skin change, tenderness, axillary adenopathy or supraclavicular adenopathy  Abdominal:      General: Abdomen is flat  Bowel sounds are normal  There is no distension  Palpations: Abdomen is soft  There is no mass  Tenderness: There is no abdominal tenderness  There is no right CVA tenderness, left CVA tenderness or guarding  Hernia: No hernia is present  Genitourinary:     General: Normal vulva  Exam position: Supine  Pubic Area: No rash or pubic lice  Labia:         Right: No rash, tenderness, lesion or injury  Left: No rash, tenderness, lesion or injury  Urethra: No prolapse, urethral pain, urethral swelling or urethral lesion  Vagina: Normal  No signs of injury and foreign body  No vaginal discharge, erythema, tenderness, bleeding, lesions or prolapsed vaginal walls  Cervix: Normal  No eversion  Uterus: Normal  Not deviated, not enlarged, not fixed, not tender and no uterine prolapse  Adnexa: Right adnexa normal and left adnexa normal         Right: No mass, tenderness or fullness  Left: No mass, tenderness or fullness  Rectum: Normal  Guaiac result negative  No mass, tenderness, anal fissure or external hemorrhoid  Normal anal tone  Lymphadenopathy:      Upper Body:      Right upper body: No supraclavicular or axillary adenopathy  Left upper body: No supraclavicular or axillary adenopathy  Skin:     General: Skin is warm and dry  Neurological:      General: No focal deficit present  Mental Status: She is alert  Psychiatric:         Mood and Affect: Mood normal          There are no Patient Instructions on file for this visit

## 2022-06-06 ENCOUNTER — HOSPITAL ENCOUNTER (OUTPATIENT)
Dept: MAMMOGRAPHY | Facility: CLINIC | Age: 72
Discharge: HOME | End: 2022-06-06
Payer: MEDICARE

## 2022-06-06 DIAGNOSIS — Z12.31 ENCOUNTER FOR SCREENING MAMMOGRAM FOR BREAST CANCER: ICD-10-CM

## 2022-06-06 PROCEDURE — 77067 SCR MAMMO BI INCL CAD: CPT

## 2022-06-06 PROCEDURE — 77063 BREAST TOMOSYNTHESIS BI: CPT

## 2022-07-07 ENCOUNTER — TELEPHONE (OUTPATIENT)
Dept: OBGYN CLINIC | Facility: CLINIC | Age: 72
End: 2022-07-07

## 2022-07-07 NOTE — TELEPHONE ENCOUNTER
Pt states she had a similar episode of bleeding a year ago with some brown spotting  Pt reports now having what pt states is similar to the start or end of a period with brown/rust color stringy bleeding/discharge  Pt states yesterday color was more red but today back to the brown/rust color and does notice an odor  Pt states noticing when wiping and also scant amount on pad  Pt denies pain, cramping, dizziness, lightheadedness, fatigue  Pt states she is currently in [place] but will be home over the weekend and pt states tested positive for covid on 7/3 but is vaccinated x2 and received booster  Apt offered and scheduled  Pt aware can been seen in office as scheduled as long as symptoms improving, fever free and ask pt wear mask  Reviewed with pt possible bx pending provider assessment and pt verbalizes understanding and has no questions at this time  Pt aware if any changes or questions or concerns prior to apt to call

## 2022-07-12 ENCOUNTER — OFFICE VISIT (OUTPATIENT)
Dept: OBGYN CLINIC | Facility: CLINIC | Age: 72
End: 2022-07-12
Payer: MEDICARE

## 2022-07-12 VITALS
SYSTOLIC BLOOD PRESSURE: 126 MMHG | BODY MASS INDEX: 31.5 KG/M2 | WEIGHT: 196 LBS | HEIGHT: 66 IN | DIASTOLIC BLOOD PRESSURE: 82 MMHG

## 2022-07-12 DIAGNOSIS — Z12.4 ENCOUNTER FOR SCREENING FOR MALIGNANT NEOPLASM OF CERVIX: ICD-10-CM

## 2022-07-12 DIAGNOSIS — N95.0 POST-MENOPAUSAL BLEEDING: Primary | ICD-10-CM

## 2022-07-12 PROCEDURE — G0124 SCREEN C/V THIN LAYER BY MD: HCPCS | Performed by: PATHOLOGY

## 2022-07-12 PROCEDURE — 88305 TISSUE EXAM BY PATHOLOGIST: CPT | Performed by: PATHOLOGY

## 2022-07-12 PROCEDURE — G0145 SCR C/V CYTO,THINLAYER,RESCR: HCPCS | Performed by: PATHOLOGY

## 2022-07-12 PROCEDURE — 58100 BIOPSY OF UTERUS LINING: CPT | Performed by: PHYSICIAN ASSISTANT

## 2022-07-12 PROCEDURE — G0476 HPV COMBO ASSAY CA SCREEN: HCPCS | Performed by: PHYSICIAN ASSISTANT

## 2022-07-12 NOTE — PROGRESS NOTES
Assessment/Plan   Diagnoses and all orders for this visit:    Post-menopausal bleeding  -     Liquid-based pap, screening  -     Tissue Exam  -     Endometrial biopsy    Encounter for screening for malignant neoplasm of cervix   -     Liquid-based pap, screening    Discussion  Pap smear collected first, followed by endometrial biopsy  Office will call with results and appropriate follow up  No intercourse, tampon use, soaking in bath tub, or swimming for the next 2-3 days to avoid risk of infection  Call office with excessive bleeding, cramping, fever, or chills  Discussed nature of postmenopausal bleeding  Explained that 10% of the time postmenopausal bleeding is due to endometrial cancer  Reviewed first line evaluation is with an endometrial biopsy - explained rationale for endometrial biopsy, to rule out endometrial hyperplasia, atypia or malignancy - reviewed the procedure - risks and benefits - patient reports taking one ibuprofen prior to the visit today  She verified she was ok to proceed/attempt procedure today  Procedure was completed successfully  Discussed that the sample may be insufficient and that D&C may be recommended  Patient expressed understanding and agrees with the plan  All questions answered at this time  Patient to call with any further questions/concerns  RTO as needed based on pending results    Subjective     HPI   Ina García is a 80 y o  female who presents for  bleeding  LMP - around 2002; Last Thursday - started noting brown vaginal discharge with wiping and had progressed from just wiping to collecting on a pad - very light flow; transitioned to bright red and also passes clots at times; Similar episode of vaginal bleeding about 1 year ago (4/2021) - brown/rust colored stringy discharge and treated for a yeast infection at the time and it resolved - the brown discharge was not as significant in amount last episode; No vulvar itch/burn; No vaginal itch/burn;  No abn discharge or odor; No urinary sx - burning/pain/frequency/hematuria  No hot flashes/night sweats, problems with intercourse, vaginal dryness; sleeping ok; No abd/pelvic pain;     Pt is not currently sexually active; She declines sti/hiv/hep testing; Feels safe at home    Last Pap - 2/13/18 - WNL (-) HRHPV type 16/18 neg  No abnormal paps in her history    Review of Systems   Constitutional: Negative for activity change, fatigue, fever and unexpected weight change  HENT: Negative for congestion, dental problem, sinus pressure and sinus pain  Eyes: Negative for visual disturbance  Respiratory: Negative for cough, shortness of breath and wheezing  Cardiovascular: Negative for chest pain and leg swelling  Gastrointestinal: Negative for abdominal distention, abdominal pain, blood in stool, constipation, diarrhea, nausea and vomiting  Endocrine: Negative for polydipsia  Genitourinary: Negative for difficulty urinating, dyspareunia, dysuria, frequency, hematuria, menstrual problem, pelvic pain, urgency, vaginal bleeding, vaginal discharge and vaginal pain  Musculoskeletal: Negative for arthralgias and back pain  Allergic/Immunologic: Negative for environmental allergies  Neurological: Negative for dizziness, seizures and headaches  Psychiatric/Behavioral: Negative for dysphoric mood and sleep disturbance  The patient is not nervous/anxious          The following portions of the patient's history were reviewed and updated as appropriate: allergies, current medications, past family history, past medical history, past social history, past surgical history and problem list          Past Medical History:   Diagnosis Date    Cancer ([facility] )     Disease of thyroid gland     Gout     History of early menarche     age 16    Hypertension     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     Thyroid cancer ([facility] ) 2000       Past Surgical History:   Procedure Laterality Date    BREAST CYST EXCISION Right 2014    neg    CHOLECYSTECTOMY      HERNIA REPAIR      INGUINAL HERNIA REPAIR      MAMMO NEEDLE LOCALIZATION RIGHT (ALL INC) Right 3/20/2014    SALIVARY GLAND SURGERY      excison of tumor    THYROIDECTOMY         Family History   Problem Relation Age of Onset    Other Mother         cardiac disorder    Breast cancer Mother [de-identified]    Diabetes Father     Colon cancer Maternal Aunt 48    Lung cancer Maternal Aunt     Diabetes Family     No Known Problems Sister     No Known Problems Daughter     No Known Problems Maternal Grandmother     No Known Problems Maternal Grandfather     Brain cancer Paternal Grandmother 76    No Known Problems Paternal Grandfather     No Known Problems Daughter     No Known Problems Paternal Aunt     No Known Problems Paternal Aunt        Social History     Socioeconomic History    Marital status: /Civil Union     Spouse name: Not on file    Number of children: Not on file    Years of education: Not on file    Highest education level: Not on file   Occupational History    Not on file   Tobacco Use    Smoking status: Former Smoker    Smokeless tobacco: Never Used   Vaping Use    Vaping Use: Never used   Substance and Sexual Activity    Alcohol use: Yes     Comment: SOCIAL    Drug use: No    Sexual activity: Yes     Partners: Male   Other Topics Concern    Not on file   Social History Narrative    Non  background    Prim spoken aryan english    Race white      Social Determinants of Health     Financial Resource Strain: Not on file   Food Insecurity: Not on file   Transportation Needs: Not on file   Physical Activity: Not on file   Stress: Not on file   Social Connections: Not on file   Intimate Partner Violence: Not on file   Housing Stability: Not on file         Current Outpatient Medications:     allopurinol (ZYLOPRIM) 300 mg tablet, Take 300 mg by mouth daily  , Disp: , Rfl:     atenolol (TENORMIN) 25 mg tablet, Take 25 mg by mouth daily  , Disp: , Rfl:    ergocalciferol (VITAMIN D2) 50,000 units, Take 12,500 Units by mouth once a week  , Disp: , Rfl:     olmesartan-hydrochlorothiazide (BENICAR HCT) 40-25 MG per tablet, , Disp: , Rfl:     simvastatin (ZOCOR) 20 mg tablet, Take 20 mg by mouth daily at bedtime  , Disp: , Rfl:     Synthroid 125 MCG tablet, , Disp: , Rfl:     Allergies   Allergen Reactions    Codeine Rash     Annotation - [id number]: Generic    Penicillin G Rash       Objective   Vitals:    07/12/22 1009   BP: 126/82   Weight: 88 9 kg (196 lb)   Height: 5' 6" (1 676 m)       Physical Exam  Vitals reviewed  Constitutional:       General: She is awake  She is not in acute distress  Appearance: Normal appearance  She is well-developed and well-groomed  She is not ill-appearing, toxic-appearing or diaphoretic  HENT:      Head: Normocephalic and atraumatic  Eyes:      Conjunctiva/sclera: Conjunctivae normal    Abdominal:      Palpations: There is no hepatomegaly or splenomegaly  Hernia: There is no hernia in the left inguinal area or right inguinal area  Genitourinary:     General: Normal vulva  Exam position: Supine  Pubic Area: No rash or pubic lice  Labia:         Right: No rash, tenderness, lesion or injury  Left: No rash, tenderness, lesion or injury  Urethra: No prolapse, urethral pain, urethral swelling or urethral lesion  Vagina: No signs of injury and foreign body  No vaginal discharge, erythema, tenderness, bleeding, lesions or prolapsed vaginal walls  Cervix: Discharge and cervical bleeding present  No cervical motion tenderness, friability, lesion or erythema  Comments: Small amount of thin rust colored discharge at the cervical os - no pooling in the vaginal canal prior to pap smear/endo biopsy collection  Lymphadenopathy:      Lower Body: No right inguinal adenopathy  No left inguinal adenopathy  Skin:     General: Skin is warm and dry     Neurological:      Mental Status: She is alert and oriented to person, place, and time  Psychiatric:         Mood and Affect: Mood and affect normal          Speech: Speech normal          Behavior: Behavior normal  Behavior is cooperative  Thought Content: Thought content normal          Judgment: Judgment normal        Endometrial biopsy    Date/Time: 7/12/2022 10:50 AM  Performed by: Carlos Houston PA-C  Authorized by: Theresa Aguirre MD   Universal Protocol:  Procedure performed by: Marlea Canavan, MA)  Consent: Verbal consent obtained  Risks and benefits: risks, benefits and alternatives were discussed  Consent given by: patient  Patient understanding: patient states understanding of the procedure being performed  Patient identity confirmed: verbally with patient      Indication:     Indications: Post-menopausal bleeding      Chronicity of post-menopausal bleeding:  Recurrent    Progression of post-menopausal bleeding:  Improving  Pre-procedure:     Premeds:  Ibuprofen  Procedure:     Procedure: endometrial biopsy with Pipelle      A bivalve speculum was placed in the vagina: yes      Cervix cleaned and prepped: yes      The cervix was dilated: yes      Uterus sounded: yes      Uterus sound depth (cm):  8    Specimen collected: specimen collected and sent to pathology      Patient tolerated procedure well with no complications: yes    Comments:     Procedure comments:  Pap smear collected first  Patient denied any allergy to betadine or shellfish  Cervix washed with betadine cotton swabs x 3; Cervix clamped with an Allis  Dilator used; Successfully inserted endometrial pipelle to about 8 cm  Endometrial sample collected x 2  Sample sent to pathology  Office will call with results and appropriate follow up  No intercourse, tampon use, soaking in bath tub, or swimming for the next 2-3 days to avoid risk of infection  Call office with excessive bleeding, cramping, fever, or chills

## 2022-07-12 NOTE — ASSESSMENT & PLAN NOTE
Pap smear collected first, followed by endometrial biopsy  Office will call with results and appropriate follow up  No intercourse, tampon use, soaking in bath tub, or swimming for the next 2-3 days to avoid risk of infection  Call office with excessive bleeding, cramping, fever, or chills

## 2022-07-14 LAB
HPV HR 12 DNA CVX QL NAA+PROBE: NEGATIVE
HPV16 DNA CVX QL NAA+PROBE: NEGATIVE
HPV18 DNA CVX QL NAA+PROBE: NEGATIVE

## 2022-07-21 ENCOUNTER — TELEPHONE (OUTPATIENT)
Dept: OBGYN CLINIC | Facility: CLINIC | Age: 72
End: 2022-07-21

## 2022-07-21 LAB
LAB AP GYN PRIMARY INTERPRETATION: ABNORMAL
LAB AP LMP: ABNORMAL
Lab: ABNORMAL
PATH INTERP SPEC-IMP: ABNORMAL

## 2022-07-21 NOTE — TELEPHONE ENCOUNTER
Pt aware of results and biopsy tissue exam still in process at this time and aware provide will review once received and will be in touch with pt once reviewed

## 2022-07-25 ENCOUNTER — TELEPHONE (OUTPATIENT)
Dept: OBGYN CLINIC | Facility: CLINIC | Age: 72
End: 2022-07-25

## 2022-07-27 NOTE — TELEPHONE ENCOUNTER
Spoke with Velvet Salines and answered all questions/concerns; patient to call back with any further questions/concerns

## 2022-07-27 NOTE — TELEPHONE ENCOUNTER
Pt is requesting a phone call from Jose Guadalupe Beard directly  Said she is confused and has questions about her test results  Offered she speak with triage and she declined

## 2022-08-02 ENCOUNTER — PROCEDURE VISIT (OUTPATIENT)
Dept: OBGYN CLINIC | Facility: CLINIC | Age: 72
End: 2022-08-02
Payer: MEDICARE

## 2022-08-02 VITALS
WEIGHT: 198 LBS | SYSTOLIC BLOOD PRESSURE: 124 MMHG | DIASTOLIC BLOOD PRESSURE: 78 MMHG | BODY MASS INDEX: 31.82 KG/M2 | HEIGHT: 66 IN

## 2022-08-02 DIAGNOSIS — N95.0 POST-MENOPAUSAL BLEEDING: Primary | ICD-10-CM

## 2022-08-02 PROCEDURE — 99212 OFFICE O/P EST SF 10 MIN: CPT | Performed by: OBSTETRICS & GYNECOLOGY

## 2022-08-02 NOTE — PROGRESS NOTES
Subjective     Meryle Croft is a 80 y o   female here for a problem visit  Patient had episode of postmenopausal bleeding followed by endometrial biopsy that showed necrotic polyp  Pap at that time showed ASCUS STANFORD high-risk HPV negative  After that biopsy patient has some heavier bleeding for 3 days which has completely resolved  Patient reports no bleeding, no cramping, no discharge  Patient fully counseled given option for colposcopy and ECC evaluation today if desired however we have low suspicion and suspect that perhaps her polyp has also been passed with the bleeding she had after biopsy  Patient has normal history of Paps throughout her earlier life  Patient also counseled to option to observe call with any further bleeding and to repeat Pap smear in 1 year after cervix has healed  Reviewed pathology in options patient would like to proceed with conservative management and repeat Pap in 1 year and call with any bleeding  Gynecologic History  Patient's last menstrual period was 2002 (approximate)  Contraception: post menopausal status  Last Pap:  2022  Results were:  ASCUS, STANFORD high-risk HPV negative done at time of endometrial biopsy  Last mammogram:  2022  Results were: normal    Obstetric History  OB History    Para Term  AB Living   2 2 2         SAB IAB Ectopic Multiple Live Births           2      # Outcome Date GA Lbr Ryan/2nd Weight Sex Delivery Anes PTL Lv   2 Term            1 Term               Obstetric Comments   : 2 SVDs         The following portions of the patient's history were reviewed and updated as appropriate: allergies, current medications, past family history, past medical history, past social history, past surgical history and problem list     Review of Systems  Review of Systems   Constitutional: Negative  Negative for chills and fever  HENT: Negative  Negative for ear pain and sore throat  Eyes: Negative    Negative for pain and visual disturbance  Respiratory: Negative  Negative for cough and shortness of breath  Cardiovascular: Negative  Negative for chest pain and palpitations  Gastrointestinal: Negative  Negative for abdominal pain and vomiting  Endocrine: Negative  Genitourinary: Negative  Negative for dysuria and hematuria  Musculoskeletal: Negative  Negative for arthralgias and back pain  Skin: Negative  Negative for color change and rash  Allergic/Immunologic: Negative  Neurological: Negative  Negative for seizures and syncope  Hematological: Negative  Psychiatric/Behavioral: Negative  All other systems reviewed and are negative         Objective     /78 (BP Location: Left arm, Patient Position: Sitting, Cuff Size: Standard)   Ht 5' 6" (1 676 m)   Wt 89 8 kg (198 lb)   LMP 2002 (Approximate)   Breastfeeding Yes   BMI 31 96 kg/m²   General appearance: alert and oriented, in no acute distress      Assessment  80year-old  prior episode of postmenopausal bleeding endometrial biopsy with necrosed polyp and Pap collected that time with inflamed cells ASCUS, STANFORD negative high-risk HPV     Plan  Patient is currently completely asymptomatic suspect she may have passed the polyp, patient will call for any further bleeding and otherwise return in 1 year for repeat Pap evaluation and follow-up

## 2023-06-08 ENCOUNTER — TELEPHONE (OUTPATIENT)
Dept: OBGYN CLINIC | Facility: CLINIC | Age: 73
End: 2023-06-08

## 2023-06-08 DIAGNOSIS — Z12.31 ENCOUNTER FOR SCREENING MAMMOGRAM FOR BREAST CANCER: Primary | ICD-10-CM

## 2023-06-08 NOTE — TELEPHONE ENCOUNTER
Patient wanted to schedule a follow up pap from her visit from last year  Patient scheduled, also put in for her to complete mammo

## 2023-06-08 NOTE — TELEPHONE ENCOUNTER
Pt lmom - needs to schedule an appt for recheck of a problem had last year, and also needs script for yearly mammogram

## 2023-06-28 ENCOUNTER — TELEPHONE (OUTPATIENT)
Dept: OBGYN CLINIC | Facility: CLINIC | Age: 73
End: 2023-06-28

## 2023-06-28 NOTE — TELEPHONE ENCOUNTER
Pt lmom, was calling in regards to missed call. Not sure if it was our office that called or another office that needed to change appointment.

## 2023-08-22 NOTE — PROGRESS NOTES
Assessment/Plan   Problem List Items Addressed This Visit    None  Visit Diagnoses     Well woman exam    -  Primary    Relevant Orders    Liquid-based pap, screening    Colon cancer screening        Relevant Orders    Cologuard    Abnormal cervical Papanicolaou smear, unspecified abnormal pap finding        Relevant Orders    Liquid-based pap, screening          Discussion    All questions have been answered to her satisfaction  RTO for APE or sooner if needed  Pap done today due to history. If normal can resume q 1-2 year follow up. Mammo scheduled for today. Subjective     HPI   Lex Puri is a 71 y.o. female who presents for annual well woman exam.   LMP - ; Denies  bleeding since episode that occurred last year. Pt had active bleeding last year. Pap showed ASCUS/STANFORD. Had endo biopsy done. Pt's bleeding that then resolved. They had reviewed D&C which she was not interested in proceeding with at that time. No vulvar itch/burn; No vaginal itch/burn; No abn discharge or odor; No urinary sx - burning/pain/frequency/hematuria    (+) SBEs - no breast masses, asymmetry, nipple discharge or bleeding, changes in skin of breast, or breast tenderness bilaterally    No abd/pelvic pain or HAs; No menopausal symptoms: No hot flashes/night sweats, problems with intercourse, vaginal dryness; sleeping well. Pt is sexually active in a mutually monog/ sexual relationship; No issues with intercourse; She declines sti/hiv/hep testing; Feels safe at home    (+) PCP for routine Bw/care; Last Pap - 22 ASCUS/STANFORD neg HPV-endo biopsy done showed infarcted polyp. History of abnormal Pap smear:   Last mammo - 22 BIRADs 2 , going today  History of abnormal mammogram:   Last colonoscopy - refused colonoscopy. Has cologuard kit but it had . She attempted twice to do process without success. Will try again this year. Review of Systems   Constitutional: Negative. Respiratory: Negative. Gastrointestinal: Negative. Endocrine: Negative. Genitourinary: Negative.         The following portions of the patient's history were reviewed and updated as appropriate: allergies, current medications, past family history, past medical history, past social history, past surgical history and problem list.         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[address])    • Thyroid cancer ([address])        Past Surgical History:   Procedure Laterality Date   • BREAST BIOPSY     • BREAST CYST EXCISION Right     neg   • CHOLECYSTECTOMY     • HERNIA REPAIR     • INGUINAL HERNIA REPAIR     • MAMMO NEEDLE LOCALIZATION RIGHT (ALL INC) Right 2014   • SALIVARY GLAND SURGERY      excison of tumor   • THYROIDECTOMY         Family History   Problem Relation Age of Onset   • Other Mother         cardiac disorder   • Breast cancer Mother 80   • Diabetes Father    • Colon cancer Maternal Aunt 50   • Lung cancer Maternal Aunt    • Diabetes Family    • No Known Problems Sister    • No Known Problems Daughter    • No Known Problems Maternal Grandmother    • No Known Problems Maternal Grandfather    • Brain cancer Paternal Grandmother 76   • No Known Problems Paternal Grandfather    • No Known Problems Daughter    • No Known Problems Paternal Aunt    • No Known Problems Paternal Aunt        Social History     Socioeconomic History   • Marital status: /Civil Union     Spouse name: Not on file   • Number of children: Not on file   • Years of education: Not on file   • Highest education level: Not on file   Occupational History   • Not on file Tobacco Use   • Smoking status: Former   • Smokeless tobacco: Never   Vaping Use   • Vaping Use: Never used   Substance and Sexual Activity   • Alcohol use: Yes     Comment: SOCIAL   • Drug use: No   • Sexual activity: Yes     Partners: Male   Other Topics Concern   • Not on file   Social History Narrative    Non  background    Prim spoken aryan english    Race white      Social Determinants of Health     Financial Resource Strain: Not on file   Food Insecurity: Not on file   Transportation Needs: Not on file   Physical Activity: Not on file   Stress: Not on file   Social Connections: Not on file   Intimate Partner Violence: Not on file   Housing Stability: Not on file         Current Outpatient Medications:   •  allopurinol (ZYLOPRIM) 300 mg tablet, Take 300 mg by mouth daily  , Disp: , Rfl:   •  atenolol (TENORMIN) 25 mg tablet, Take 25 mg by mouth daily  , Disp: , Rfl:   •  ergocalciferol (VITAMIN D2) 50,000 units, Take 12,500 Units by mouth once a week  , Disp: , Rfl:   •  Icosapent Ethyl 1 g CAPS, , Disp: , Rfl:   •  olmesartan-hydrochlorothiazide (BENICAR HCT) 40-25 MG per tablet, , Disp: , Rfl:   •  simvastatin (ZOCOR) 20 mg tablet, Take 20 mg by mouth daily at bedtime. , Disp: , Rfl:   •  Synthroid 125 MCG tablet, , Disp: , Rfl:     Allergies   Allergen Reactions   • Codeine Rash     Annotation - [id number]: Generic   • Penicillin G Rash       Objective   Vitals:    08/23/23 1220   BP: 122/70   BP Location: Left arm   Patient Position: Sitting   Cuff Size: Standard   Weight: 88.9 kg (196 lb)   Height: 5' 5" (1.651 m)     Physical Exam  Vitals reviewed. HENT:      Head: Normocephalic and atraumatic. Cardiovascular:      Rate and Rhythm: Normal rate and regular rhythm. Pulmonary:      Effort: Pulmonary effort is normal.      Breath sounds: Normal breath sounds.    Chest:   Breasts:     Breasts are symmetrical.      Right: No swelling, bleeding, inverted nipple, mass, nipple discharge, skin change or tenderness. Left: No swelling, bleeding, inverted nipple, mass, nipple discharge, skin change or tenderness. Abdominal:      General: Abdomen is flat. Bowel sounds are normal.      Palpations: Abdomen is soft. Tenderness: There is no abdominal tenderness. There is no right CVA tenderness, left CVA tenderness or guarding. Genitourinary:     General: Normal vulva. Pubic Area: No rash. Labia:         Right: No rash, tenderness, lesion or injury. Left: No rash, tenderness, lesion or injury. Urethra: No prolapse, urethral pain, urethral swelling or urethral lesion. Vagina: Normal. No signs of injury and foreign body. No vaginal discharge or erythema. Cervix: Normal.      Uterus: Normal.       Adnexa: Right adnexa normal and left adnexa normal.   Musculoskeletal:      Cervical back: Neck supple. Lymphadenopathy:      Upper Body:      Right upper body: No axillary adenopathy. Left upper body: No axillary adenopathy. Skin:     General: Skin is warm and dry. Neurological:      Mental Status: She is alert and oriented to person, place, and time. Psychiatric:         Mood and Affect: Mood normal.         Behavior: Behavior normal.         Thought Content: Thought content normal.         Judgment: Judgment normal.         There are no Patient Instructions on file for this visit.

## 2023-08-23 ENCOUNTER — OFFICE VISIT (OUTPATIENT)
Dept: OBGYN CLINIC | Facility: CLINIC | Age: 73
End: 2023-08-23
Payer: MEDICARE

## 2023-08-23 ENCOUNTER — HOSPITAL ENCOUNTER (OUTPATIENT)
Dept: RADIOLOGY | Facility: MEDICAL CENTER | Age: 73
Discharge: HOME | End: 2023-08-23
Payer: MEDICARE

## 2023-08-23 VITALS
DIASTOLIC BLOOD PRESSURE: 70 MMHG | BODY MASS INDEX: 32.65 KG/M2 | WEIGHT: 196 LBS | HEIGHT: 65 IN | SYSTOLIC BLOOD PRESSURE: 122 MMHG

## 2023-08-23 VITALS — WEIGHT: 196 LBS | BODY MASS INDEX: 32.65 KG/M2 | HEIGHT: 65 IN

## 2023-08-23 DIAGNOSIS — Z01.419 WELL WOMAN EXAM: Primary | ICD-10-CM

## 2023-08-23 DIAGNOSIS — R87.619 ABNORMAL CERVICAL PAPANICOLAOU SMEAR, UNSPECIFIED ABNORMAL PAP FINDING: ICD-10-CM

## 2023-08-23 DIAGNOSIS — Z12.11 COLON CANCER SCREENING: ICD-10-CM

## 2023-08-23 DIAGNOSIS — Z12.31 ENCOUNTER FOR SCREENING MAMMOGRAM FOR BREAST CANCER: ICD-10-CM

## 2023-08-23 PROCEDURE — 77067 SCR MAMMO BI INCL CAD: CPT

## 2023-08-23 PROCEDURE — G0101 CA SCREEN;PELVIC/BREAST EXAM: HCPCS | Performed by: PHYSICIAN ASSISTANT

## 2023-08-23 PROCEDURE — G0476 HPV COMBO ASSAY CA SCREEN: HCPCS | Performed by: PHYSICIAN ASSISTANT

## 2023-08-23 PROCEDURE — G0145 SCR C/V CYTO,THINLAYER,RESCR: HCPCS | Performed by: PHYSICIAN ASSISTANT

## 2023-08-23 PROCEDURE — 77063 BREAST TOMOSYNTHESIS BI: CPT

## 2023-08-23 RX ORDER — ICOSAPENT ETHYL 1000 MG/1
CAPSULE ORAL
COMMUNITY
Start: 2023-08-22

## 2023-08-30 LAB
LAB AP GYN PRIMARY INTERPRETATION: NORMAL
Lab: NORMAL

## 2024-02-01 ENCOUNTER — NEW PATIENT (OUTPATIENT)
Dept: URBAN - METROPOLITAN AREA CLINIC 6 | Facility: CLINIC | Age: 74
End: 2024-02-01

## 2024-02-01 DIAGNOSIS — H53.021: ICD-10-CM

## 2024-02-01 DIAGNOSIS — H52.02: ICD-10-CM

## 2024-02-01 DIAGNOSIS — H31.091: ICD-10-CM

## 2024-02-01 DIAGNOSIS — H25.813: ICD-10-CM

## 2024-02-01 DIAGNOSIS — H52.4: ICD-10-CM

## 2024-02-01 DIAGNOSIS — H52.202: ICD-10-CM

## 2024-02-01 DIAGNOSIS — H04.123: ICD-10-CM

## 2024-02-01 PROCEDURE — 92004 COMPRE OPH EXAM NEW PT 1/>: CPT

## 2024-02-01 PROCEDURE — 92015 DETERMINE REFRACTIVE STATE: CPT

## 2024-02-01 ASSESSMENT — KERATOMETRY
OD_AXISANGLE_DEGREES: 125
OS_K2POWER_DIOPTERS: 43.50
OS_AXISANGLE2_DEGREES: 64
OD_K2POWER_DIOPTERS: 43.00
OS_K1POWER_DIOPTERS: 43.00
OD_K1POWER_DIOPTERS: 42.00
OD_AXISANGLE2_DEGREES: 35
OS_AXISANGLE_DEGREES: 154

## 2024-02-01 ASSESSMENT — VISUAL ACUITY
OS_CC: 20/30+2
OD_CC: 20/400
OU_CC: J2

## 2024-02-01 ASSESSMENT — TONOMETRY
OS_IOP_MMHG: 17
OD_IOP_MMHG: 19

## 2024-02-21 PROBLEM — Z01.419 ENCOUNTER FOR ANNUAL ROUTINE GYNECOLOGICAL EXAMINATION: Status: RESOLVED | Noted: 2018-02-13 | Resolved: 2024-02-21

## 2024-04-15 ENCOUNTER — TELEPHONE (OUTPATIENT)
Age: 74
End: 2024-04-15

## 2024-04-15 DIAGNOSIS — Z12.31 ENCOUNTER FOR SCREENING MAMMOGRAM FOR MALIGNANT NEOPLASM OF BREAST: Primary | ICD-10-CM

## 2024-09-18 ENCOUNTER — HOSPITAL ENCOUNTER (OUTPATIENT)
Dept: RADIOLOGY | Facility: MEDICAL CENTER | Age: 74
Discharge: HOME | End: 2024-09-18
Payer: MEDICARE

## 2024-09-18 VITALS — BODY MASS INDEX: 32.65 KG/M2 | WEIGHT: 196 LBS | HEIGHT: 65 IN

## 2024-09-18 DIAGNOSIS — Z12.31 ENCOUNTER FOR SCREENING MAMMOGRAM FOR MALIGNANT NEOPLASM OF BREAST: ICD-10-CM

## 2024-09-18 PROCEDURE — 77067 SCR MAMMO BI INCL CAD: CPT

## 2024-09-18 PROCEDURE — 77063 BREAST TOMOSYNTHESIS BI: CPT

## 2024-09-20 DIAGNOSIS — Z12.31 ENCOUNTER FOR SCREENING MAMMOGRAM FOR MALIGNANT NEOPLASM OF BREAST: Primary | ICD-10-CM

## 2025-06-13 ENCOUNTER — TELEPHONE (OUTPATIENT)
Dept: OBGYN CLINIC | Facility: CLINIC | Age: 75
End: 2025-06-13

## 2025-06-13 NOTE — TELEPHONE ENCOUNTER
Patient called the RX Refill Line. Message is being forwarded to the office.     Patient is requesting an order for a mammogram.     Patient is requesting a call back when the order is entered.

## 2025-06-13 NOTE — TELEPHONE ENCOUNTER
An order was placed for her by ofelia last year after her Mammo. Advice pt to call and try to make an apt if they request a new Mammo then we will place a new one.